# Patient Record
Sex: MALE | Race: WHITE | NOT HISPANIC OR LATINO | Employment: UNEMPLOYED | URBAN - NONMETROPOLITAN AREA
[De-identification: names, ages, dates, MRNs, and addresses within clinical notes are randomized per-mention and may not be internally consistent; named-entity substitution may affect disease eponyms.]

---

## 2023-01-09 ENCOUNTER — OFFICE VISIT (OUTPATIENT)
Dept: URGENT CARE | Facility: MEDICAL CENTER | Age: 23
End: 2023-01-09

## 2023-01-09 VITALS
RESPIRATION RATE: 20 BRPM | BODY MASS INDEX: 21.76 KG/M2 | WEIGHT: 175 LBS | HEIGHT: 75 IN | OXYGEN SATURATION: 99 % | HEART RATE: 69 BPM | TEMPERATURE: 97.8 F

## 2023-01-09 DIAGNOSIS — H66.90 ACUTE OTITIS MEDIA, UNSPECIFIED OTITIS MEDIA TYPE: Primary | ICD-10-CM

## 2023-01-09 RX ORDER — BUDESONIDE 3 MG/1
9 CAPSULE, COATED PELLETS ORAL DAILY
COMMUNITY
Start: 2022-11-21

## 2023-01-09 RX ORDER — AZITHROMYCIN 250 MG/1
TABLET, FILM COATED ORAL
Qty: 6 TABLET | Refills: 0 | Status: SHIPPED | OUTPATIENT
Start: 2023-01-09 | End: 2023-01-13

## 2023-01-09 RX ORDER — URSODIOL 300 MG/1
600 CAPSULE ORAL 2 TIMES DAILY
COMMUNITY
Start: 2022-12-27

## 2023-01-09 NOTE — PATIENT INSTRUCTIONS
Debrox drop for your ears will help with the impacted wax in your ears  You make take Over the Counter Tylenol (Acetaminophen) and/or Motrin (Ibuprofen) as needed, as directed on packaging

## 2023-01-09 NOTE — PROGRESS NOTES
[id number]        NAME: Lucio Hernández is a 25 y o  male  : 2000    MRN: [id number]  DATE: 2023  TIME: 11:17 AM    Assessment and Plan   Acute otitis media, unspecified otitis media type [H66 90]  1  Acute otitis media, unspecified otitis media type  azithromycin (ZITHROMAX) 250 mg tablet            Patient Instructions       Follow up with PCP in 3-5 days  Proceed to  ER if symptoms worsen  Chief Complaint     Chief Complaint   Patient presents with   • Earache     Pt  C/o right ear pain, pulsating pain behind ear started yesterday, denies fever, denies injury, denies recent cold symptoms          History of Present Illness       Right posterior ear pain  Denies any recent illness  Denies any fever  Denies any trauma  History of frequent ear infections as a child  Review of Systems   Review of Systems   Constitutional: Negative for chills and fever  HENT: Negative for congestion, ear pain, nosebleeds, postnasal drip, rhinorrhea, sinus pressure, sinus pain, sneezing, sore throat, tinnitus and trouble swallowing  Eyes: Negative for pain, discharge, itching and visual disturbance  Respiratory: Negative for cough and shortness of breath  Cardiovascular: Negative for chest pain and palpitations  Gastrointestinal: Negative for abdominal pain, constipation, diarrhea, nausea and vomiting  Genitourinary: Negative for dysuria and hematuria  Musculoskeletal: Negative for arthralgias and back pain  Skin: Negative for color change and rash  Neurological: Negative for seizures and syncope  All other systems reviewed and are negative          Current Medications       Current Outpatient Medications:   •  azithromycin (ZITHROMAX) 250 mg tablet, Take 2 tablets today then 1 tablet daily x 4 days, Disp: 6 tablet, Rfl: 0  •  budesonide (ENTOCORT EC) 3 MG capsule, Take 9 mg by mouth daily, Disp: , Rfl:   •  ursodiol (ACTIGALL) 300 mg capsule, Take 600 mg by mouth 2 (two) times a day, Disp: , Rfl:     Current Allergies     Allergies as of 01/09/2023   • (No Known Allergies)            The following portions of the patient's history were reviewed and updated as appropriate: allergies, current medications, past family history, past medical history, past social history, past surgical history and problem list      Past Medical History:   Diagnosis Date   • Autoimmune disease of liver        Past Surgical History:   Procedure Laterality Date   • LIVER BIOPSY         History reviewed  No pertinent family history  Medications have been verified  Objective   Pulse 69   Temp 97 8 °F (36 6 °C)   Resp 20   Ht 6' 3" (1 905 m)   Wt 79 4 kg (175 lb)   SpO2 99%   BMI 21 87 kg/m²        Physical Exam     Physical Exam  Vitals and nursing note reviewed  Constitutional:       General: He is not in acute distress  Appearance: Normal appearance  He is normal weight  He is not ill-appearing  HENT:      Head: Normocephalic and atraumatic  Right Ear: Ear canal normal  No decreased hearing noted  No swelling  A middle ear effusion is present  There is impacted cerumen  There is mastoid tenderness  Tympanic membrane is erythematous  Left Ear: Tympanic membrane, ear canal and external ear normal       Ears:      Comments: Moderate amount of dried, flaky cerumen removed with curette to facilitate exam       Nose: Nose normal       Mouth/Throat:      Mouth: Mucous membranes are moist       Pharynx: Oropharynx is clear  Eyes:      Extraocular Movements: Extraocular movements intact  Conjunctiva/sclera: Conjunctivae normal       Pupils: Pupils are equal, round, and reactive to light  Cardiovascular:      Rate and Rhythm: Normal rate and regular rhythm  Pulses: Normal pulses  Heart sounds: Normal heart sounds  Pulmonary:      Effort: Pulmonary effort is normal       Breath sounds: Normal breath sounds  Abdominal:      General: Abdomen is flat   Bowel sounds are normal       Palpations: Abdomen is soft  Musculoskeletal:         General: Normal range of motion  Cervical back: Normal range of motion and neck supple  Skin:     General: Skin is warm  Capillary Refill: Capillary refill takes less than 2 seconds  Neurological:      General: No focal deficit present  Mental Status: He is alert and oriented to person, place, and time     Psychiatric:         Mood and Affect: Mood normal          Behavior: Behavior normal